# Patient Record
Sex: FEMALE | Race: WHITE | NOT HISPANIC OR LATINO | Employment: UNEMPLOYED | URBAN - METROPOLITAN AREA
[De-identification: names, ages, dates, MRNs, and addresses within clinical notes are randomized per-mention and may not be internally consistent; named-entity substitution may affect disease eponyms.]

---

## 2023-12-05 ENCOUNTER — OFFICE VISIT (OUTPATIENT)
Dept: SURGERY | Facility: CLINIC | Age: 63
End: 2023-12-05
Payer: MEDICARE

## 2023-12-05 ENCOUNTER — TELEPHONE (OUTPATIENT)
Dept: ENDOSCOPY | Facility: HOSPITAL | Age: 63
End: 2023-12-05
Payer: MEDICARE

## 2023-12-05 VITALS
SYSTOLIC BLOOD PRESSURE: 165 MMHG | DIASTOLIC BLOOD PRESSURE: 75 MMHG | WEIGHT: 114.5 LBS | HEIGHT: 65 IN | BODY MASS INDEX: 19.08 KG/M2 | HEART RATE: 53 BPM

## 2023-12-05 DIAGNOSIS — K27.9 PEPTIC ULCER DISEASE: Primary | ICD-10-CM

## 2023-12-05 PROCEDURE — 99999 PR PBB SHADOW E&M-NEW PATIENT-LVL IV: ICD-10-PCS | Mod: PBBFAC,,, | Performed by: SURGERY

## 2023-12-05 PROCEDURE — 99204 OFFICE O/P NEW MOD 45 MIN: CPT | Mod: PBBFAC | Performed by: SURGERY

## 2023-12-05 PROCEDURE — 99999 PR PBB SHADOW E&M-NEW PATIENT-LVL IV: CPT | Mod: PBBFAC,,, | Performed by: SURGERY

## 2023-12-05 PROCEDURE — 99204 PR OFFICE/OUTPT VISIT, NEW, LEVL IV, 45-59 MIN: ICD-10-PCS | Mod: S$PBB,,, | Performed by: SURGERY

## 2023-12-05 PROCEDURE — 99204 OFFICE O/P NEW MOD 45 MIN: CPT | Mod: S$PBB,,, | Performed by: SURGERY

## 2023-12-05 RX ORDER — PNV NO.95/FERROUS FUM/FOLIC AC 28MG-0.8MG
100 TABLET ORAL DAILY
COMMUNITY

## 2023-12-05 RX ORDER — PROCHLORPERAZINE MALEATE 10 MG
10 TABLET ORAL 3 TIMES DAILY
Qty: 60 TABLET | Refills: 6 | Status: SHIPPED | OUTPATIENT
Start: 2023-12-05

## 2023-12-05 RX ORDER — METOCLOPRAMIDE HYDROCHLORIDE 5 MG/5ML
5 SOLUTION ORAL 4 TIMES DAILY
Qty: 473 ML | Refills: 6 | Status: SHIPPED | OUTPATIENT
Start: 2023-12-05

## 2023-12-05 RX ORDER — ATORVASTATIN CALCIUM 80 MG/1
80 TABLET, FILM COATED ORAL DAILY
COMMUNITY

## 2023-12-05 RX ORDER — GABAPENTIN 300 MG/1
300 CAPSULE ORAL 3 TIMES DAILY
COMMUNITY

## 2023-12-05 RX ORDER — DEFERASIROX 500 MG/1
500 TABLET, FOR SUSPENSION ORAL
COMMUNITY

## 2023-12-05 RX ORDER — SUCRALFATE 1 G/10ML
1 SUSPENSION ORAL 4 TIMES DAILY
COMMUNITY

## 2023-12-05 RX ORDER — PROMETHAZINE HYDROCHLORIDE 25 MG/1
25 TABLET ORAL EVERY 4 HOURS
COMMUNITY

## 2023-12-05 RX ORDER — ONDANSETRON HYDROCHLORIDE 8 MG/1
8 TABLET, FILM COATED ORAL 2 TIMES DAILY
COMMUNITY

## 2023-12-05 RX ORDER — DEFERASIROX 250 MG/1
250 TABLET, FOR SUSPENSION ORAL
COMMUNITY

## 2023-12-05 RX ORDER — FOLIC ACID 1 MG/1
1 TABLET ORAL DAILY
COMMUNITY

## 2023-12-05 RX ORDER — PANTOPRAZOLE SODIUM 40 MG/1
40 TABLET, DELAYED RELEASE ORAL DAILY
COMMUNITY

## 2023-12-05 RX ORDER — LEVETIRACETAM 250 MG/1
500 TABLET ORAL 2 TIMES DAILY
COMMUNITY

## 2023-12-05 NOTE — PROGRESS NOTES
I have seen the patient, reviewed the Student's history and physical, assessment and plan. I have personally interviewed and examined the patient at bedside and: agree with the findings.     64y/o with cirrhosis, s/p lrgby 2007, pouch procedure with partial distal gastrectomy for g-j stricture by me 2009 and pud.  She has postprandial epigastric pain with nausea and without vomiting worsening especially over the last few months.  She came into the ER with stricture and bleeding ulcer in September.  She continues to have severe burning and difficulty eating.  She has lost 20ish lb in the last year.  She does get weekly mvi infusions for malabsorption.  She doesn't smoke and isn't around anyone that smokes.  She denies taking nsaids.  She rarely drinks alcohol.  She is using cannabis edibles for nausea.  She uses phenergan for nausea but doesn't like the side effects.  Zofran doesn't help.  She is on liquid carafate, pepsid bid, and bid ppi.    Cbc, cmp reviewed, slightly elevated lfts and bili  Fibroscan 2023 reviewed, fibrosis grade 4  Ct 2020 reviewed, no bowel abnormalities other than post surgery state noted  Ct 2023 reviewed, inflammation at g-j, air in excluded stomach, possible intussusception, c/w pud and possible g-g fistula  Egd 2023 reviewed, stricture and ulcer  Mrcp 2019 reviewed, bile ducts ok    S/p bypass and pouch procedure with pud and g-j stricture, possible g-g fistula.  Obtain gastrin level.  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  I suggest stopping cannabis as it could be causing ulcers.  Try reglan and compazine.  Obtain ct films and ugisbf.  Obtain egd with measurements and dilation.  If she needs her pouch resected she will need E-J and will work with Surg Onc on this.

## 2023-12-05 NOTE — PROGRESS NOTES
"History & Physical    SUBJECTIVE:     History of Present Illness:  Patient is a 63 y.o. female with PMHx of PUD, chronic pancreatitis, cirrhosis (2/2 to hemachromatosis), B12 deficiency, malabsoprtion syndrome (on weekly infusions), hx of MRSA infection (due to PICC line infection) and s/p shanell en y gastric bypass (2007) and pouch procedure with partial distal gastrectomy for g-j stricture (2009, Dr. Oconnor) presents with recurring bleeding ulcers. Following 2009 surgery, patient reports she was doing well and as of a couple years ago, GI bleeding and GERD sx returned. Patient most recently visited [facility] ED 9/15/23 for dypshagia and odynophagia to solids and liquids in addition to hematochezia. Patient was taken for EGD which revealed actively bleeding ulcers which were clipped. Following d/c, patient denies any recurrent episodes of hematochezia. Although, she endorses progressively worsening GERD sx. Describes heartburn and chest pain; describes "volcano" in chest. Also endorses intermittent dysphagia to both solids and liquids; describes difficulty swallowing own saliva at times. Endorse constant nausea (controllable with phenegran but sparingly used; zofran not helpful and intermittent bloating. Other medications include liquid carafate, pepsid bid, and bid ppi. Patient uses cannabis (edibles) to assist with appetite.     Patient was advised by doctors at [facility] to RTC for evaluation of reintervention by Dr. Oconnor.      Review of patient's allergies indicates:   Allergen Reactions    Nsaids (non-steroidal anti-inflammatory drug) Other (See Comments)     CAUSES GI BLEEDING       Current Outpatient Medications   Medication Sig Dispense Refill    atorvastatin (LIPITOR) 80 MG tablet Take 80 mg by mouth once daily.      cyanocobalamin (VITAMIN B-12) 100 MCG tablet Take 100 mcg by mouth once daily.      deferasirox (EXJADE) 250 MG disintegrating tablet Take 250 mg by mouth before breakfast.      " "deferasirox (EXJADE) 500 MG disintegrating tablet Take 500 mg by mouth before breakfast.      famotidine (PEPCID ORAL) Take by mouth.      folic acid (FOLVITE) 1 MG tablet Take 1 mg by mouth once daily.      gabapentin (NEURONTIN) 300 MG capsule Take 300 mg by mouth 3 (three) times daily.      levETIRAcetam (KEPPRA) 250 MG Tab Take 500 mg by mouth 2 (two) times daily.      LISINOPRIL ORAL Take by mouth.      ondansetron (ZOFRAN) 8 MG tablet Take 8 mg by mouth 2 (two) times daily.      pantoprazole (PROTONIX) 40 MG tablet Take 40 mg by mouth once daily.      promethazine (PHENERGAN) 25 MG tablet Take 25 mg by mouth every 4 (four) hours.      sucralfate (CARAFATE) 100 mg/mL suspension Take 1 g by mouth 4 (four) times daily.       No current facility-administered medications for this visit.       No past medical history on file.  No past surgical history on file.  No family history on file.  Social History     Tobacco Use    Smoking status: Former     Current packs/day: 0.00     Types: Cigarettes     Quit date:      Years since quittin.9    Smokeless tobacco: Never        Review of Systems:  Review of Systems    OBJECTIVE:     Vital Signs (Most Recent)  Pulse: (!) 53 (23)  BP: (!) 165/75 (23)  5' 4.5" (1.638 m)  51.9 kg (114 lb 8.5 oz)     Physical Exam:  Physical Exam    Laboratory  CBC: Reviewed  CMP: Reviewed      Diagnostic Results:  CT: Result Reviewed  EGD: Result Reviewed    ASSESSMENT/PLAN:     CT finding concerning for fistula.     PLAN:  - Obtain OSH CT scan films   - Obtain pancreatic enzyme labs.                    "

## 2023-12-07 DIAGNOSIS — R10.13 EPIGASTRIC PAIN: Primary | ICD-10-CM

## 2023-12-08 DIAGNOSIS — R10.13 EPIGASTRIC PAIN: Primary | ICD-10-CM

## 2024-01-17 ENCOUNTER — PATIENT MESSAGE (OUTPATIENT)
Dept: SURGERY | Facility: CLINIC | Age: 64
End: 2024-01-17
Payer: MEDICARE

## 2024-01-26 ENCOUNTER — TELEPHONE (OUTPATIENT)
Dept: ENDOSCOPY | Facility: HOSPITAL | Age: 64
End: 2024-01-26
Payer: MEDICARE

## 2024-01-26 VITALS — BODY MASS INDEX: 19.46 KG/M2 | HEIGHT: 64 IN | WEIGHT: 114 LBS

## 2024-01-26 DIAGNOSIS — R10.13 EPIGASTRIC PAIN: Primary | ICD-10-CM

## 2024-01-26 NOTE — TELEPHONE ENCOUNTER
----- Message from Nadine Moncada sent at 1/22/2024  8:14 AM CST -----  Regarding: FW: EGD    ----- Message -----  From: Amanda Ovalles MA  Sent: 1/18/2024   4:08 PM CST  To: [facility] Endo Schedulers  Subject: EGD                                              Good Afternoon,  Mrs. Hassan needs an EGD w/ dilation and pouch measurements.  Please call her for scheduling.  Thank you!

## 2024-02-02 NOTE — TELEPHONE ENCOUNTER
Spoke to patient to schedule procedure(s) Upper Endoscopy (EGD)       Physician to perform procedure(s) Dr. RENNY Segura  Date of Procedure (s) 05/02/2024  Arrival Time 10:15 am   Time of Procedure(s) 11:15 am    Location of Procedure(s) [address]  Type of Rx Prep sent to patient: N/A  Instructions provided to patient via [facility]    Patient was informed on the following information and verbalized understanding. Screening questionnaire reviewed with patient and complete. If procedure requires anesthesia, a responsible adult needs to be present to accompany the patient home, patient cannot drive after receiving anesthesia. Appointment details are tentative, especially check-in time. Patient will receive a prep-op call 7 days prior to confirm check-in time for procedure. If applicable the patient should contact their pharmacy to verify Rx for procedure prep is ready for pick-up. Patient was advised to call the scheduling department at [phone] if pharmacy states no Rx is available. Patient was advised to call the endoscopy scheduling department if any questions or concerns arise.      [facility]oscopy Scheduling Department

## 2024-05-01 ENCOUNTER — TELEPHONE (OUTPATIENT)
Dept: ENDOSCOPY | Facility: HOSPITAL | Age: 64
End: 2024-05-01
Payer: MEDICARE

## 2024-05-01 NOTE — TELEPHONE ENCOUNTER
Contacted Pt for endoscopy pre-call for upcoming procedure.  Pre-call complete, Pt does not have any further questions. Arrival time:10:30 am

## 2024-05-02 ENCOUNTER — ANESTHESIA EVENT (OUTPATIENT)
Dept: ENDOSCOPY | Facility: HOSPITAL | Age: 64
End: 2024-05-02
Payer: MEDICARE

## 2024-05-02 ENCOUNTER — ANESTHESIA (OUTPATIENT)
Dept: ENDOSCOPY | Facility: HOSPITAL | Age: 64
End: 2024-05-02
Payer: MEDICARE

## 2024-05-02 ENCOUNTER — HOSPITAL ENCOUNTER (OUTPATIENT)
Facility: HOSPITAL | Age: 64
Discharge: HOME | End: 2024-05-02
Attending: INTERNAL MEDICINE | Admitting: INTERNAL MEDICINE
Payer: MEDICARE

## 2024-05-02 VITALS
HEART RATE: 53 BPM | WEIGHT: 115 LBS | OXYGEN SATURATION: 97 % | BODY MASS INDEX: 19.63 KG/M2 | DIASTOLIC BLOOD PRESSURE: 77 MMHG | TEMPERATURE: 98 F | SYSTOLIC BLOOD PRESSURE: 154 MMHG | HEIGHT: 64 IN | RESPIRATION RATE: 16 BRPM

## 2024-05-02 DIAGNOSIS — R10.9 ABDOMINAL PAIN: ICD-10-CM

## 2024-05-02 PROCEDURE — 37000009 HC ANESTHESIA EA ADD 15 MINS: Performed by: INTERNAL MEDICINE

## 2024-05-02 PROCEDURE — E9220 PRA ENDO ANESTHESIA: HCPCS | Mod: ,,, | Performed by: NURSE ANESTHETIST, CERTIFIED REGISTERED

## 2024-05-02 PROCEDURE — 37000008 HC ANESTHESIA 1ST 15 MINUTES: Performed by: INTERNAL MEDICINE

## 2024-05-02 PROCEDURE — 43235 EGD DIAGNOSTIC BRUSH WASH: CPT | Performed by: INTERNAL MEDICINE

## 2024-05-02 PROCEDURE — 25000003 PHARM REV CODE 250: Performed by: INTERNAL MEDICINE

## 2024-05-02 PROCEDURE — 43235 EGD DIAGNOSTIC BRUSH WASH: CPT | Mod: ,,, | Performed by: INTERNAL MEDICINE

## 2024-05-02 PROCEDURE — 25000003 PHARM REV CODE 250: Performed by: NURSE ANESTHETIST, CERTIFIED REGISTERED

## 2024-05-02 RX ORDER — LIDOCAINE HYDROCHLORIDE 20 MG/ML
INJECTION INTRAVENOUS
Status: DISCONTINUED | OUTPATIENT
Start: 2024-05-02 | End: 2024-05-02

## 2024-05-02 RX ORDER — PROPOFOL 10 MG/ML
VIAL (ML) INTRAVENOUS
Status: DISCONTINUED | OUTPATIENT
Start: 2024-05-02 | End: 2024-05-02

## 2024-05-02 RX ORDER — PROPOFOL 10 MG/ML
INJECTION, EMULSION INTRAVENOUS CONTINUOUS PRN
Status: DISCONTINUED | OUTPATIENT
Start: 2024-05-02 | End: 2024-05-02

## 2024-05-02 RX ORDER — SODIUM CHLORIDE 9 MG/ML
INJECTION, SOLUTION INTRAVENOUS CONTINUOUS
Status: DISCONTINUED | OUTPATIENT
Start: 2024-05-02 | End: 2024-05-02 | Stop reason: HOSPADM

## 2024-05-02 RX ADMIN — Medication 50 MG: at 11:05

## 2024-05-02 RX ADMIN — LIDOCAINE HYDROCHLORIDE 75 MG: 20 INJECTION INTRAVENOUS at 11:05

## 2024-05-02 RX ADMIN — PROPOFOL 200 MCG/KG/MIN: 10 INJECTION, EMULSION INTRAVENOUS at 11:05

## 2024-05-02 RX ADMIN — Medication 100 MG: at 11:05

## 2024-05-02 RX ADMIN — SODIUM CHLORIDE: 0.9 INJECTION, SOLUTION INTRAVENOUS at 10:05

## 2024-05-02 NOTE — TRANSFER OF CARE
"Anesthesia Transfer of Care Note    Patient: Stephanie Hassan    Procedure(s) Performed: Procedure(s) (LRB):  EGD (ESOPHAGOGASTRODUODENOSCOPY) (N/A)    Patient location: GI    Anesthesia Type: general    Transport from OR: Transported from OR on room air with adequate spontaneous ventilation    Post pain: adequate analgesia    Post assessment: no apparent anesthetic complications    Post vital signs: stable    Level of consciousness: responds to stimulation and sedated    Nausea/Vomiting: no nausea/vomiting    Complications: none    Transfer of care protocol was followed      Last vitals: Visit Vitals  /62 (BP Location: Left arm, Patient Position: Lying)   Pulse 66   Temp 36.5 °C (97.7 °F) (Temporal)   Resp 20   Ht 5' 4" (1.626 m)   Wt 52.2 kg (115 lb)   SpO2 96% 3L NC   Breastfeeding No   BMI 19.74 kg/m²     "

## 2024-05-02 NOTE — PLAN OF CARE
Procedural plan of care reviewed with patient. Verbalized understanding and questions answered.    Received VM from patient's wife stating that patient has been lightheaded. Stated that she has been checking BP and HR. Stated BP runs 120/60's and HR runs in the 50's. Asked that I discuss with Dr. Stevenson on possible medication adjustments.     Please advise.

## 2024-05-02 NOTE — H&P
[facility]- On license of [facility] 4th Floor  Gastroenterology  H&P    Patient Name: Stephanie Hassan  MRN: [id number]  Admission Date: 2024  Code Status: No Order    Attending Provider: patricia briscoe  Primary Care Physician: No, Primary Doctor  Principal Problem:<principal problem not specified>    Subjective:     History of Present Illness: abdominal pain, reflux    Past Medical History:   Diagnosis Date    Chronic pancreatitis     GERD (gastroesophageal reflux disease)     Hemochromatosis     Hypertension     Peptic ulcer     Unspecified cirrhosis of liver        Past Surgical History:   Procedure Laterality Date    cataract surgery      GASTRIC BYPASS      gastric pouch procedure for stricture      orthopedic procedures         Review of patient's allergies indicates:   Allergen Reactions    Nsaids (non-steroidal anti-inflammatory drug) Other (See Comments)     CAUSES GI BLEEDING     Family History       Problem Relation (Age of Onset)    Breast cancer Sister    Cancer Mother, Father          Tobacco Use    Smoking status: Former     Current packs/day: 0.00     Types: Cigarettes     Quit date:      Years since quittin.3    Smokeless tobacco: Never   Substance and Sexual Activity    Alcohol use: Yes     Comment: rarely    Drug use: Never    Sexual activity: Not on file     Review of Systems   Respiratory: Negative.     Cardiovascular: Negative.      Objective:     Vital Signs (Most Recent):  Temp: 97 °F (36.1 °C) (24 1055)  Pulse: (!) 53 (24 1055)  Resp: 17 (24 1055)  BP: (!) 155/70 (24 1055)  SpO2: 99 % (24 1055) Vital Signs (24h Range):  Temp:  [97 °F (36.1 °C)] 97 °F (36.1 °C)  Pulse:  [53] 53  Resp:  [17] 17  SpO2:  [99 %] 99 %  BP: (155)/(70) 155/70     Weight: 52.2 kg (115 lb) (24 1055)  Body mass index is 19.74 kg/m².    No intake or output data in the 24 hours ending 24 1125    Lines/Drains/Airways       Peripheral Intravenous Line  Duration                   Peripheral IV - Single Lumen 05/02/24 1053 20 G Anterior;Right Hand <1 day                    Physical Exam  Cardiovascular:      Rate and Rhythm: Normal rate and regular rhythm.   Pulmonary:      Effort: Pulmonary effort is normal.      Breath sounds: Normal breath sounds.         Significant Labs:      Significant Imaging:      Assessment/Plan:     There are no hospital problems to display for this patient.      Indication for procedure:    ASA:III  Airway normal  Malampati class:    Personal and family history negative for anesthesia problems    Plan:egd  Anesthesia plan: general      Ronan Salcedo MD  Gastroenterology  [facility] 4th Floor

## 2024-05-02 NOTE — ANESTHESIA PREPROCEDURE EVALUATION
2024  Stephanie Hassan is a 64 y.o., female.    Past Medical History:   Diagnosis Date    Chronic pancreatitis     GERD (gastroesophageal reflux disease)     Hemochromatosis     Hypertension     Peptic ulcer     Unspecified cirrhosis of liver      Patient Active Problem List   Diagnosis    Peptic ulcer disease     Review of patient's allergies indicates:   Allergen Reactions    Nsaids (non-steroidal anti-inflammatory drug) Other (See Comments)     CAUSES GI BLEEDING     Social History     Socioeconomic History    Marital status:    Tobacco Use    Smoking status: Former     Current packs/day: 0.00     Types: Cigarettes     Quit date:      Years since quittin.3    Smokeless tobacco: Never   Substance and Sexual Activity    Alcohol use: Yes     Comment: rarely    Drug use: Never     Past Surgical History:   Procedure Laterality Date    cataract surgery      GASTRIC BYPASS      gastric pouch procedure for stricture      orthopedic procedures       No current facility-administered medications on file prior to encounter.     Current Outpatient Medications on File Prior to Encounter   Medication Sig Dispense Refill    atorvastatin (LIPITOR) 80 MG tablet Take 80 mg by mouth once daily.      deferasirox (EXJADE) 250 MG disintegrating tablet Take 250 mg by mouth before breakfast.      deferasirox (EXJADE) 500 MG disintegrating tablet Take 500 mg by mouth before breakfast.      famotidine (PEPCID ORAL) Take by mouth.      folic acid (FOLVITE) 1 MG tablet Take 1 mg by mouth once daily.      gabapentin (NEURONTIN) 300 MG capsule Take 300 mg by mouth 3 (three) times daily.      levETIRAcetam (KEPPRA) 250 MG Tab Take 500 mg by mouth 2 (two) times daily.      LISINOPRIL ORAL Take by mouth.      metoclopramide HCl (REGLAN) 5 mg/5 mL Soln Take 5 mLs (5 mg total) by mouth 4 (four) times daily. 473 mL 6     pantoprazole (PROTONIX) 40 MG tablet Take 40 mg by mouth once daily.      sucralfate (CARAFATE) 100 mg/mL suspension Take 1 g by mouth 4 (four) times daily.      cyanocobalamin (VITAMIN B-12) 100 MCG tablet Take 100 mcg by mouth once daily.      ondansetron (ZOFRAN) 8 MG tablet Take 8 mg by mouth 2 (two) times daily.      prochlorperazine (COMPAZINE) 10 MG tablet Take 1 tablet (10 mg total) by mouth 3 (three) times daily. 60 tablet 6    promethazine (PHENERGAN) 25 MG tablet Take 25 mg by mouth every 4 (four) hours.       Pre-op Assessment    I have reviewed the NPO Status.      Review of Systems  Anesthesia Hx:               Denies Personal Hx of Anesthesia complications.                    Social:  Former Smoker       Cardiovascular:     Hypertension                                        Pulmonary:  Pulmonary Normal                       Renal/:  Renal/ Normal                 Hepatic/GI:   PUD,  GERD          Liver Disease, Abnormal Liver Enzymes  , Cirrhosis      Neurological:  Neurology Normal                                      Endocrine:  Endocrine Normal                Physical Exam    Airway:  Mallampati: II   Neck ROM: Normal ROM    Dental:  Partial Dentures        Anesthesia Plan  Type of Anesthesia, risks & benefits discussed:    Anesthesia Type: Gen Natural Airway  Intra-op Monitoring Plan: Standard ASA Monitors  Induction:  IV  Informed Consent: Informed consent signed with the Patient and all parties understand the risks and agree with anesthesia plan.  All questions answered.   ASA Score: 3    Ready For Surgery From Anesthesia Perspective.     .

## 2024-05-02 NOTE — PROVATION PATIENT INSTRUCTIONS
Discharge Summary/Instructions after an Endoscopic Procedure  Patient Name: Stephanie Hassan  Patient MRN: [id number]  Patient YOB: 1960  Thursday, May 2, 2024  Ronan Salcedo MD  Dear patient,  As a result of recent federal legislation (The Federal Cures Act), you may   receive lab or pathology results from your procedure in your [facility]   account before your physician is able to contact you. Your physician or   their representative will relay the results to you with their   recommendations at their soonest availability.  Thank you,  RESTRICTIONS:  During your procedure today, you received medications for sedation.  These   medications may affect your judgment, balance and coordination.  Therefore,   for 24 hours, you have the following restrictions:   - DO NOT drive a car, operate machinery, make legal/financial decisions,   sign important papers or drink alcohol.    ACTIVITY:  Today: no heavy lifting, straining or running due to procedural   sedation/anesthesia.  The following day: return to full activity including work.  DIET:  Eat and drink normally unless instructed otherwise.     TREATMENT FOR COMMON SIDE EFFECTS:  - Mild abdominal pain, nausea, belching, bloating or excessive gas:  rest,   eat lightly and use a heating pad.  - Sore Throat: treat with throat lozenges and/or gargle with warm salt   water.  - Because air was used during the procedure, expelling large amounts of air   from your rectum or belching is normal.  - If a bowel prep was taken, you may not have a bowel movement for 1-3 days.    This is normal.  SYMPTOMS TO WATCH FOR AND REPORT TO YOUR PHYSICIAN:  1. Abdominal pain or bloating, other than gas cramps.  2. Chest pain.  3. Back pain.  4. Signs of infection such as: chills or fever occurring within 24 hours   after the procedure.  5. Rectal bleeding, which would show as bright red, maroon, or black stools.   (A tablespoon of blood from the rectum is not serious, especially if    hemorrhoids are present.)  6. Vomiting.  7. Weakness or dizziness.  GO DIRECTLY TO THE NEAREST EMERGENCY ROOM IF YOU HAVE ANY OF THE FOLLOWING:      Difficulty breathing              Chills and/or fever over 101 F   Persistent vomiting and/or vomiting blood   Severe abdominal pain   Severe chest pain   Black, tarry stools   Bleeding- more than one tablespoon   Any other symptom or condition that you feel may need urgent attention  Your doctor recommends these additional instructions:  If any biopsies were taken, your doctors clinic will contact you in 1 to 2   weeks with any results.  - Patient has a contact number available for emergencies.  The signs and   symptoms of potential delayed complications were discussed with the   patient.  Return to normal activities tomorrow.  Written discharge   instructions were provided to the patient.   - Discharge patient to home (ambulatory).   - Resume previous diet.   - Continue present medications.   - Return to referring physician after studies are complete.  For questions, problems or results please call your physician - Ronan Salcedo MD at Work:  [phone].  [facility], EMERGENCY ROOM PHONE NUMBER: [phone]  IF A COMPLICATION OR EMERGENCY SITUATION ARISES AND YOU ARE UNABLE TO REACH   YOUR PHYSICIAN - GO DIRECTLY TO THE EMERGENCY ROOM.  oRnan Salcedo MD  5/2/2024 11:40:31 AM  This report has been verified and signed electronically.  Dear patient,  As a result of recent federal legislation (The Federal Cures Act), you may   receive lab or pathology results from your procedure in your [facility]   account before your physician is able to contact you. Your physician or   their representative will relay the results to you with their   recommendations at their soonest availability.  Thank you,  PROVATION

## 2024-05-02 NOTE — PLAN OF CARE
Discharge instructions and education provided. Patient verbalizes understanding and has no questions at this time. Discharge home in stable conditions with responsible party.

## 2024-05-03 NOTE — ANESTHESIA POSTPROCEDURE EVALUATION
Anesthesia Post Evaluation    Patient: Stephanie Hassan    Procedure(s) Performed: Procedure(s) (LRB):  EGD (ESOPHAGOGASTRODUODENOSCOPY) (N/A)    Final Anesthesia Type: general      Patient location during evaluation: GI PACU  Patient participation: Yes- Able to Participate  Level of consciousness: awake  Post-procedure vital signs: reviewed and stable  Pain management: adequate  Airway patency: patent    PONV status at discharge: No PONV  Anesthetic complications: no      Cardiovascular status: blood pressure returned to baseline  Respiratory status: unassisted, spontaneous ventilation and room air                Vitals Value Taken Time   /77 05/02/24 1216   Temp 36.5 °C (97.7 °F) 05/02/24 1144   Pulse 53 05/02/24 1216   Resp 16 05/02/24 1216   SpO2 97 % 05/02/24 1216         Event Time   Out of Recovery 12:17:42         Pain/Brandon Score: Brandon Score: 10 (5/2/2024 11:58 AM)

## 2024-05-15 ENCOUNTER — PATIENT MESSAGE (OUTPATIENT)
Dept: SURGERY | Facility: CLINIC | Age: 64
End: 2024-05-15
Payer: MEDICARE

## 2024-05-17 ENCOUNTER — TELEPHONE (OUTPATIENT)
Dept: SURGERY | Facility: CLINIC | Age: 64
End: 2024-05-17
Payer: MEDICARE

## 2024-05-17 NOTE — TELEPHONE ENCOUNTER
----- Message from Riana Regan sent at 5/17/2024  1:18 PM CDT -----  Regarding: call back  Contact: [phone]  Pt calling in requesting call back to see if she schedule; apt   please call to discuss further

## 2024-05-17 NOTE — TELEPHONE ENCOUNTER
Called and spoke with patient  Virtual appointment scheduled on 5/30 at 4:30 pm   Will send instruction for virtual visit through the portal

## 2024-06-11 ENCOUNTER — OFFICE VISIT (OUTPATIENT)
Dept: SURGERY | Facility: CLINIC | Age: 64
End: 2024-06-11
Payer: MEDICARE

## 2024-06-11 DIAGNOSIS — K27.9 PEPTIC ULCER DISEASE: Primary | ICD-10-CM

## 2024-06-11 PROCEDURE — 99214 OFFICE O/P EST MOD 30 MIN: CPT | Mod: 95,,, | Performed by: SURGERY

## 2024-06-11 RX ORDER — CHOLESTYRAMINE 4 G/9G
4 POWDER, FOR SUSPENSION ORAL
Qty: 270 PACKET | Refills: 3 | Status: SHIPPED | OUTPATIENT
Start: 2024-06-11 | End: 2025-06-11

## 2024-06-11 NOTE — LETTER
June 11, 2024        [facility]  [address]  [facility] [address]  Phone: [phone]   Patient: Stephanie Hassan   MR Number: [id number]   YOB: 1960   Date of Visit: 6/11/2024       Dear Dr. Hassan:    Thank you for referring Stephanie Hassan to me for evaluation. Attached you will find relevant portions of my assessment and plan of care.    If you have questions, please do not hesitate to call me. I look forward to following Stephanie Hassan along with you.    Sincerely,      Mamadou Oconnor MD            CC  No Recipients    Enclosure

## 2024-06-11 NOTE — PROGRESS NOTES
The patient location is: home  The chief complaint leading to consultation is: pud    Visit type: audiovisual      16 minutes of total time spent on the encounter, which includes face to face time and non-face to face time preparing to see the patient (eg, review of tests), Obtaining and/or reviewing separately obtained history, Documenting clinical information in the electronic or other health record, Independently interpreting results (not separately reported) and communicating results to the patient/family/caregiver, or Care coordination (not separately reported).         Each patient to whom he or she provides medical services by telemedicine is:  (1) informed of the relationship between the physician and patient and the respective role of any other health care provider with respect to management of the patient; and (2) notified that he or she may decline to receive medical services by telemedicine and may withdraw from such care at any time.    Notes:     62y/o with cirrhosis, s/p lrgby 2007, pouch procedure with partial distal gastrectomy for g-j stricture by me 2009 and pud.      History 12/5/24:  She has postprandial epigastric pain with nausea and without vomiting worsening especially over the last few months.  She came into the ER with stricture and bleeding ulcer in September.  She continues to have severe burning and difficulty eating.  She has lost 20ish lb in the last year.  She does get weekly mvi infusions for malabsorption.  She doesn't smoke and isn't around anyone that smokes.  She denies taking nsaids.  She rarely drinks alcohol.  She is using cannabis edibles for nausea.  She uses phenergan for nausea but doesn't like the side effects.  Zofran doesn't help.  She is on liquid carafate, pepsid bid, and bid ppi.    Interval history 6/11/24:  She is still using cannabis and not sure she is taking pancreatic replacement.  She has been using reglan bid and  rarely takes compazine without relief. She  continues to have stomach and chest burning.  This makes it hard to swallows.  This comes several times a day.  She takes carafate 3-4 times a day, pepcid bid and ppi bid.      Cbc, cmp reviewed, slightly elevated lfts and bili  Gastrin level normal/low  Fibroscan 2023 reviewed, fibrosis grade 4  Ct 2020 reviewed, no bowel abnormalities other than post surgery state noted  Ct 2023 reviewed, inflammation at g-j, air in excluded stomach, possible intussusception, c/w pud and possible g-g fistula  Ct 12/2023 reviewed, no acute problem  Egd 2023 reviewed, stricture and ulcer  Egd 2024 reviewed, grade c esophagitis, 5 cm pouch, no pud  Ugi 2023 reviewed, small hh, 5.5cm gastric pouch, no gerd, other findings in report  Mrcp 2019 reviewed, bile ducts ok     S/p bypass and pouch procedure with pud-currently improved and g-j stricture.  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  I suggest stopping cannabis as it could be causing ulcers.  If she needs her pouch resected she will need E-J and will work with Surg Onc on this. Increase reglan to 5 mg qid and if that doesn't work increase the strength and rx questran in case some of her reflux is bile induced.  Follow up 6 months and follow up with GI.